# Patient Record
Sex: MALE | Race: WHITE
[De-identification: names, ages, dates, MRNs, and addresses within clinical notes are randomized per-mention and may not be internally consistent; named-entity substitution may affect disease eponyms.]

---

## 2017-03-07 ENCOUNTER — HOSPITAL ENCOUNTER (OUTPATIENT)
Dept: HOSPITAL 47 - RADXRYALE | Age: 27
Discharge: HOME | End: 2017-03-07
Payer: COMMERCIAL

## 2017-03-07 DIAGNOSIS — R07.1: Primary | ICD-10-CM

## 2017-03-07 PROCEDURE — 71020: CPT

## 2017-03-09 NOTE — XR
EXAMINATION TYPE: XR chest 2V

 

DATE OF EXAM: 3/7/2017 11:57 AM

 

COMPARISON: Thoracic spine x-ray from 19 2016

 

HISTORY: Burning right-sided chest pain.

 

TECHNIQUE:  Frontal and lateral views of the chest are obtained.

 

FINDINGS: Some elevation and eventration of the anterior aspect right hemidiaphragm is present. There
 is no focal air space opacity, pleural effusion, or pneumothorax seen.  The cardiac silhouette size 
is within normal limits. Slight underlying levoconvex scoliotic curvature in the upper thoracic spine
 is redemonstrated.

 

IMPRESSION:  No acute cardiopulmonary process.

## 2017-05-31 ENCOUNTER — HOSPITAL ENCOUNTER (OUTPATIENT)
Dept: HOSPITAL 47 - RADUSWWP | Age: 27
Discharge: HOME | End: 2017-05-31
Payer: COMMERCIAL

## 2017-05-31 DIAGNOSIS — R10.9: Primary | ICD-10-CM

## 2017-05-31 PROCEDURE — 76700 US EXAM ABDOM COMPLETE: CPT

## 2017-05-31 NOTE — US
EXAMINATION TYPE: US abdomen complete

 

DATE OF EXAM: 5/31/2017

 

COMPARISON: NONE

 

CLINICAL HISTORY: R10.9 Abdominal pain. Weight gain, Lupus, Nausea/vomiting.

 

EXAM MEASUREMENTS:

 

Liver Length:  17.0 cm   

Gallbladder Wall:  0.2 cm   

CBD:  0.3 cm

Spleen:  10.5 cm   

Right Kidney:  12.5 x 5.9 x 4.9 cm 

Left Kidney:  12.5 x 5.7 x 5.4  cm   

 

 

 

Pancreas:  wnl, partially obscured by bowel gas.

Liver:  Large, hyperechoic, with areas of focal sparing near GB  

Gallbladder:  wnl

**Evidence for sonographic Loera's sign:  no

CBD:  wnl 

Spleen:  wnl, upper limits for size 

Right Kidney:  Small cyst mid/upper pole = 0.8 x 0.8 x 0.7 cm    

Left Kidney:  wnl   

Upper IVC:  wnl  

Abd Aorta:  wnl

 

 

 

The visualized liver is heterogeneously hyperechoic. Evaluation for focal masses suboptimal due to th
e heterogeneity.  The intrahepatic portion of the IVC and visualized abdominal aorta are within sherron
l limits.  There is no evidence of cholelithiasis.  Common bile duct is unremarkable.  The visualized
 portions of the pancreas are slightly heterogeneous.  The spleen is unremarkable.  Kidneys are symme
tric and free of hydronephrosis.  No renal lesions are seen.

 

IMPRESSION: Heterogeneous hyperechoic appearance of liver favors diffuse fatty infiltration.

## 2019-07-30 ENCOUNTER — HOSPITAL ENCOUNTER (OUTPATIENT)
Dept: HOSPITAL 47 - RADXRYALE | Age: 29
Discharge: HOME | End: 2019-07-30
Attending: FAMILY MEDICINE
Payer: COMMERCIAL

## 2019-07-30 DIAGNOSIS — M65.4: ICD-10-CM

## 2019-07-30 DIAGNOSIS — M25.531: Primary | ICD-10-CM

## 2019-07-30 NOTE — XR
EXAMINATION TYPE: XR wrist complete RT

 

DATE OF EXAM: 7/30/2019

 

CLINICAL HISTORY: Scaphoid pain for one month

 

TECHNIQUE:  Frontal, lateral and oblique images of the right wrist are obtained. Scaphoid view was al
so obtained.

 

COMPARISON: None

 

FINDINGS:  There is no acute fracture/dislocation evident in the right wrist.  The joint spaces in th
e right wrist appear within normal limits.  The overlying soft tissue appears unremarkable.

 

IMPRESSION:  There is no acute fracture or dislocation in the right wrist. MRI could evaluate for oss
eous contusion, tendinous injury or ligamentous injury if there is further clinical concern and sophia
nued snuffbox pain.

## 2020-01-20 ENCOUNTER — HOSPITAL ENCOUNTER (OUTPATIENT)
Dept: HOSPITAL 47 - RADMRIMAIN | Age: 30
Discharge: HOME | End: 2020-01-20
Attending: FAMILY MEDICINE
Payer: COMMERCIAL

## 2020-01-20 DIAGNOSIS — M51.36: Primary | ICD-10-CM

## 2020-01-20 PROCEDURE — 72158 MRI LUMBAR SPINE W/O & W/DYE: CPT

## 2020-01-20 PROCEDURE — 72141 MRI NECK SPINE W/O DYE: CPT

## 2020-01-20 NOTE — MR
EXAMINATION TYPE: MR cervical spine wo con

 

DATE OF EXAM: 1/20/2020

 

COMPARISON: Prior exam cervical spine MR 10/10/2016

 

HISTORY: Cervical stenosis

 

TECHNIQUE: 

Multiplanar, multisequence images of the cervical spine were acquired.

 

C2-C3: No evidence for degenerative disc disease.  No disc bulge/herniation or protrusion.  No Canal 
stenosis. Uncovertebral joint hypertrophy encroaches somewhat on the right neural foramen similar to 
prior exam.

 

C3-C4: No evidence for degenerative disc disease.  No disc bulge/herniation or protrusion.  No Canal 
stenosis. Uncovertebral joint hypertrophy encroaches somewhat on the right neural foramen similar to 
prior exam..

 

C4-C5: Posterior extension endplate disc complex causes mild anterior mass effect on the thecal sac. 
Bilateral foraminal encroachment is present as on prior exam.

 

C5-C6: No evidence for degenerative disc disease.  No disc bulge/herniation or protrusion.  No Canal 
stenosis. Some mild foraminal encroachment due to uncovertebral joint hypertrophy noted..

 

C6-C7: No evidence for degenerative disc disease.  No disc bulge/herniation or protrusion.  No Canal 
stenosis.  Foramina are patent bilaterally.

 

C7-T1: No evidence for degenerative disc disease.  No disc bulge/herniation or protrusion.  No Canal 
stenosis.  Foramina are patent bilaterally.

 

Cervical segments are intact.  There is normal alignment.  Cervical spinal cord is of normal signal. 
 Craniovertebral junction relationships are within normal limits.  Spinal curvature is again noted. L
oss of disc height and signal is greatest at C4-5, C5-6 but also present at C3-4, C6-7. There is asso
ciated spondylosis as on prior exam.

 

IMPRESSION:

Mild degenerative disc disease is similar to prior exam. There is a spinal curvature present.

## 2020-01-20 NOTE — MR
EXAMINATION TYPE: MR lumbar spine wo/w con

 

DATE OF EXAM: 1/20/2020

 

COMPARISON: Prior lumbar MRI 10/10/2016

 

HISTORY: Low back pain

 

TECHNIQUE: 

Multiplanar, multisequence images of the lumbar spine were acquired utilizing 11.5 mL intravenous Timothy
avist gadolinium contrast.    

 

L1-L2: Normal disc appearance without desiccation.  No herniation, protrusion or disc bulging.  No ca
nal stenosis is present.  Foramina are patent bilaterally.

 

L2-L3: Normal disc appearance without desiccation.  No herniation, protrusion or disc bulging.  No ca
nal stenosis is present.  Foramina are patent bilaterally.

 

L3-L4: Posterior disc bulge causes anterior mass effect on the thecal sac. Increased signal at the po
sterior aspect of the disc consistent with annular tear. Circumferential extension endplate disc comp
levar encroaches somewhat on the neural foramina greater on the right.

 

L4-L5: Listhesis contributes to cause bilateral foraminal encroachment greater on the left. There are
 laminectomy changes present.

 

L5-S1: Normal disc appearance without desiccation.  No herniation, protrusion or disc bulging.  No ca
nal stenosis is present.  Foramina are patent bilaterally.

 

Lumbar segments are stable.  No paraspinal masses are identified.  Conus medullaris has a normal appe
arance. Posterior fusion is present at L4-5 with persistent listhesis L4-5 grade 1. Prior tract prese
nt bilaterally at transitional segment previously denoted as S1, S1 previously placed screws again no
otto and have been removed. Tarlov cysts are noted in the sacral region. Loss of disc height signal ag
ain noted L3-4, L4-5 and L5-S1, there is endplate marrow signal changes present L5-S1. T2 bright foci
 within the kidneys likely represent cysts. Dorsal enhancing soft tissue at the L4 level is again see
n and is likely postoperative granulation tissue No additional abnormal enhancement following contras
t administration.

 

IMPRESSION:

Postop changes as described are similar to prior exam. Transitional lumbosacral spine segment, correl
ate with plain film prior to any intervention.

## 2020-01-24 ENCOUNTER — HOSPITAL ENCOUNTER (OUTPATIENT)
Dept: HOSPITAL 47 - RADMRIMAIN | Age: 30
Discharge: HOME | End: 2020-01-24
Attending: FAMILY MEDICINE
Payer: COMMERCIAL

## 2020-01-24 DIAGNOSIS — M51.34: Primary | ICD-10-CM

## 2020-01-24 PROCEDURE — 72146 MRI CHEST SPINE W/O DYE: CPT

## 2020-01-24 NOTE — MR
EXAMINATION TYPE: MR thoracic spine wo con

 

DATE OF EXAM: 1/24/2020

 

COMPARISON: None

 

HISTORY: Back pain

 

CONTRAST:  Performed utilizing 0 mL intravenous Gadavist gadolinium contrast.  

 

TECHNIQUE: Multiplanar, multiecho imaging on a 3.0 Brenda magnet is performed through the thoracic spi
ne. 

 

Spinal cord maintains normal signal through its visualized course.

Vertebral body alignment is normal. Some mild scoliosis in the upper thoracic lower cervical spine ma
y be present, this could be positional.

Vertebral body heights are preserved.

Disc heights are preserved.

Disc hydration levels are preserved. No disc herniations are evident.

 

No spinal canal stenosis is evident. No syrinx is identified.

 

IMPRESSIONS:

 

1. Mild upper thoracic scoliosis which could be positional.

2. MRI Thoracic spine is otherwise unremarkable.

## 2020-02-04 ENCOUNTER — HOSPITAL ENCOUNTER (OUTPATIENT)
Dept: HOSPITAL 47 - PNWHC3 | Age: 30
Discharge: HOME | End: 2020-02-04
Attending: ANESTHESIOLOGY
Payer: COMMERCIAL

## 2020-02-04 VITALS — HEART RATE: 88 BPM | SYSTOLIC BLOOD PRESSURE: 122 MMHG | DIASTOLIC BLOOD PRESSURE: 86 MMHG | RESPIRATION RATE: 14 BRPM

## 2020-02-04 DIAGNOSIS — G89.29: Primary | ICD-10-CM

## 2020-02-04 DIAGNOSIS — Z79.891: ICD-10-CM

## 2020-02-04 DIAGNOSIS — M47.812: ICD-10-CM

## 2020-02-04 DIAGNOSIS — M50.30: ICD-10-CM

## 2020-02-04 DIAGNOSIS — M47.816: ICD-10-CM

## 2020-02-04 DIAGNOSIS — Z79.899: ICD-10-CM

## 2020-02-04 DIAGNOSIS — M96.1: ICD-10-CM

## 2020-02-04 PROCEDURE — 99211 OFF/OP EST MAY X REQ PHY/QHP: CPT

## 2020-02-04 NOTE — P.CONS
History of Present Illness





- Reason for Consult


Consult date: 02/04/20





- Chief Complaint


Neck and lower back pain





- History of Present Illness


This is a 29-year-old gentleman with chronic history of neck and lower back 

pain.  The patient had 2 surgeries on the lumbar spine with lumbar fusion one in

2005 and the second one in 2013.  He describes his pain as sharp and shooting it

is mostly in his lower back and goes to the right buttock area but does not go 

to the lower extremities.  He denies any numbness or tingling in the lower 

extremities however he does feel numbness and tingling in both arms with neck 

pain which radiates down to the shoulders only.  The patient has been taking 

Percocet and morphine for his pain but this has not been controlling his pain 

well.  He denies any bowel or bladder dysfunction or any weight loss recently.  

He also denies any fever or chills recently.  The pain does wake the patient up 

at night.  The patient is unemployed at this point and he we'll apply for 

disability because of his chronic pain problem.








Past Medical History


Past Medical History: Hypertension


Additional Past Medical History / Comment(s): chronic back and neck pain, hx of 

back fx at age 11 or 12, states being tested for lupus, hx of low testosterone,


History of Any Multi-Drug Resistant Organisms: None Reported


Past Surgical History: Back Surgery


Additional Past Surgical History / Comment(s): fusion 2005 and 2013, rods and 

screws, previous epidurals and pain clinic procedures


Past Anesthesia/Blood Transfusion Reactions: No Reported Reaction


Smoking Status: Never smoker





- Past Family History


  ** Mother


Family Medical History: No Reported History





Medications and Allergies


                                Home Medications











 Medication  Instructions  Recorded  Confirmed  Type


 


Atenolol/Chlorthalidone 1 each PO DAILY 01/30/20 01/30/20 History





[Atenolol-Chlorthalidone 100-25]    


 


Baclofen [Lioresal] 20 mg PO TID 01/30/20 01/30/20 History


 


DULoxetine HCL [Cymbalta] 60 mg PO DAILY 01/30/20 01/30/20 History


 


Ergocalciferol [Vitamin D2 50,000 unit PO Q30D 01/30/20 01/30/20 History





(DRISDOL)]    


 


Gabapentin [Neurontin] 800 mg PO QID 01/30/20 02/04/20 History


 


Morphine Sulfate [Morphine Sulfate 30 mg PO Q12H 01/30/20 02/04/20 History





ER]    


 


Testosterone    200 mg INJ FR 01/30/20  History


 


oxyCODONE-APAP 10-325MG [Percocet 1 tab PO TID 01/30/20 02/04/20 History





 mg]    








                                    Allergies











Allergy/AdvReac Type Severity Reaction Status Date / Time


 


No Known Allergies Allergy   Verified 01/30/20 15:06














Physical Exam


Vitals: 


                                   Vital Signs











  Pulse Resp BP


 


 02/04/20 11:47  88  14  122/86














- Constitutional


General appearance: average body habitus





- EENT


Eyes: PERRLA





- Respiratory


Respiratory: bilateral: CTA





- Cardiovascular


Rhythm: regular





- Neurologic


Neuro exam of the upper extremities showed normal and symmetrical deep tendon 

reflexes and normal and symmetrical muscle strength.


Neuro exam of the lower extremities showed normal and symmetrical muscle 

strength, decreased knee reflexes bilaterally, absent ankle reflexes 

bilaterally.


Straight leg raising test negative bilaterally.





Neurologic: CNII-XII intact





- Musculoskeletal


Positive tenderness in the lumbar paravertebral musculature bilaterally.  Mild 

tenderness around the sacroiliac joints bilaterally.  Decreased range of motion 

of the lumbar spine.


Normal but painful range of motion of the cervical spine.


Postoperative tenderness in the cervical paravertebral musculature and in the 

trapezius muscles bilaterally.








Results


Results: 


Lumbar spine MRI showed annular tear and disc bulging at the L3-L4 level due to 

associated with posterior fusion at the L4 5 with persistent listhesis at L4 5 

level and grade 1 and a transitional segment at L5-S1 level.


The cervical spine MRI showed mild degenerative disc disease with loss of disc 

height especially at 








Assessment and Plan


Plan: 


This is a 29-year-old gentleman with history of chronic neck and lower back pain

with the following diagnoses:


Lumbar postlaminectomy pain syndrome


Lumbar DDD


Lumbar spondylosis without myelopathy


Cervical DDD


Cervical spondylosis without myelopathy





We'll plan to do a diagnostic lumbar medial branch block above the fusion level.

 We will proceed with RFA in the future of this block helps the patient's pain.


The patient had cervical epidural steroid injection previously which gave him 3 

months of pain relief and if his neck pain continues to be a problem we can plan

on doing cervical epidural steroid injection in the future.  The patient denies 

taking any anticoagulants.  And he is not diabetic.





I thank you for the referral

## 2020-03-05 ENCOUNTER — HOSPITAL ENCOUNTER (OUTPATIENT)
Dept: HOSPITAL 47 - ORPAIN | Age: 30
Discharge: HOME | End: 2020-03-05
Attending: ANESTHESIOLOGY
Payer: COMMERCIAL

## 2020-03-05 VITALS — BODY MASS INDEX: 30.4 KG/M2

## 2020-03-05 VITALS — HEART RATE: 74 BPM | DIASTOLIC BLOOD PRESSURE: 57 MMHG | SYSTOLIC BLOOD PRESSURE: 111 MMHG

## 2020-03-05 VITALS — TEMPERATURE: 98.8 F | RESPIRATION RATE: 16 BRPM

## 2020-03-05 DIAGNOSIS — M47.816: ICD-10-CM

## 2020-03-05 DIAGNOSIS — G89.29: Primary | ICD-10-CM

## 2020-03-05 PROCEDURE — 99152 MOD SED SAME PHYS/QHP 5/>YRS: CPT

## 2020-03-05 PROCEDURE — 64493 INJ PARAVERT F JNT L/S 1 LEV: CPT

## 2020-03-05 PROCEDURE — 64494 INJ PARAVERT F JNT L/S 2 LEV: CPT

## 2020-03-05 NOTE — FL
EXAMINATION TYPE: FL guided pain mgmt statistic

 

DATE OF EXAM: 3/5/2020

 

CLINICAL HISTORY: Low back pain.

 

TECHNIQUE: Fluoroscopy.

 

COMPARISON:  None.

 

FINDINGS:  Fluoroscopic guidance was provided during pain relief procedure performed by Dr. Jensen 
.  A total of 10 seconds of fluoroscopic time was utilized during the procedure and 6 spot images are
 acquired. Images acquired shows needle localization at multiple levels of the lumbar spine.

 

IMPRESSION:  As Above.

## 2020-03-05 NOTE — P.PCN
Date of Procedure: 03/05/20


Procedure(s) Performed: 














PREOPERATIVE DIAGNOSIS   :   1-  Lumbar spondylosis with  Facet Arthropathy 

without myelopathy . 





POSTOPERATIVE DIAGNOSIS:   1-  Lumbar spondylosis with  Facet Arthropathy 

without myelopathy .  


 


PROCEDURE: Diagnostic  bilateral  L1 , L2  ,L3  , medial branch block under  

fluoroscopy guidance(fluoroscopy images available in the radiology Department )


                                ( To target the facet joint between L2-3  , and 

L3-4  )





ANESTHESIA:, moderate sedation with intravenous Versed  2  mg and Fentanyl 50   

mcg.


EBL: Minimal





COMPLICATION: None.





IV FLUIDS: 100 mL of normal saline.


 


PROCEDURE INDICATION: Chronic low back pain secondary to Facet arthropathy 

unresponsive to conservative treatment.  





PROCEDURE DESCRIPTION:  the patient was seen and identified in the preop holding

area , risks and benefits and possible complications of the procedure and 

alternative                                                                     

                                                                                

                                                                                

                                            were discussed with the patient, and

the patient agreed  to proceed with the procedure and signed the consent        

                                                                                

                                                 IV was started and vital signs 

monitored during the  procedure and fluoroscopy was used to maximize the benefit

and accuracy of the needle placement, and sedation was given to decrease patient

 anxiety, patient was taken to the procedure room and placed in prone position 

vital signs monitored in the back prepped with chlorhexidine X3 then under 

strict sterile technique using a right oblique fluoroscopy ,the  junction of the

transverse process and the superior articulating process of the right L1 , L2 , 

L3 ,  vertebra which corresponding to the fluoroscopy image of the eye of the 

Sam dog on the block side for the medial branches and subsequently , after 

local infiltration of skin and subcu tissuies with Ropivacaine 0.5 %  , one mL  

at  each level ,then  22-gauge Quincke-type needles , 3 needle was used  , each 

one of them placed at the junction of the base of the transverse process and the

superior articular process at the appropriate level, and the needle was advanced

until the periosteum contacted, needle placement confirmed with AP oblique and 

lateral view and after appropriate needle placement confirmed, and after 

negative aspiration for heme and CSF and there was no paresthesia 1-1/2 mL of 

Ropivacaine 0.5% mixed with 20 mg Depo-Medrol , then  half mL injected at each 

level after negative aspiration the needle subsequently removed and the same 

procedure repeated for the left side at left side at  L1 ,L2 ,L3 , levels.


At the end of the procedure and the needles removed and a bandage applied after 

the skin was cleaned the cleaning solution patient taken to recovery room in 

stable condition and monitors in the recovery room for 20-30 minutes and 

discharged home in stable condition after discharge criteria met and patient 

will follow up with the pain clinic in 2-4 weeks

## 2023-08-24 ENCOUNTER — HOSPITAL ENCOUNTER (OUTPATIENT)
Dept: HOSPITAL 47 - RADXRYALE | Age: 33
Discharge: HOME | End: 2023-08-24
Attending: PHYSICIAN ASSISTANT
Payer: COMMERCIAL

## 2023-08-24 DIAGNOSIS — M54.6: Primary | ICD-10-CM

## 2023-08-24 PROCEDURE — 72072 X-RAY EXAM THORAC SPINE 3VWS: CPT

## 2023-08-24 NOTE — XR
EXAMINATION TYPE: XR thoracic spine complete

 

DATE OF EXAM: 8/24/2023 3:38 PM

 

INDICATION: 

Patient age:Male;  33 years old; 

Reason for study: M546 THOR PAIN; [facility]. 

 

COMPARISON: Thoracic spine radiograph 4/19/2016, MRI thoracic spine 1/24/2020

 

TECHNIQUE: 3 views of the thoracic spine in frontal, lateral, Schwimmer's projections. 

 

FINDINGS: 

Mild central compression deformity involving the superior endplate of the likely T8 vertebral body. T
here is approximately 5% height loss. No obvious retropulsion. There is no evidence of disk space ashish
rowing. There is normal alignment of the thoracic vertebral bodies. 

 

IMPRESSION: 

Age-indeterminate mild central compression deformity of the superior endplate of likely T8 vertebral 
body. Correlate with point tenderness.

## 2023-10-30 ENCOUNTER — HOSPITAL ENCOUNTER (OUTPATIENT)
Dept: HOSPITAL 47 - RADMRIMAIN | Age: 33
Discharge: HOME | End: 2023-10-30
Attending: NEUROLOGICAL SURGERY
Payer: COMMERCIAL

## 2023-10-30 DIAGNOSIS — G95.89: ICD-10-CM

## 2023-10-30 DIAGNOSIS — M47.14: Primary | ICD-10-CM

## 2023-10-30 DIAGNOSIS — M50.021: ICD-10-CM

## 2023-10-30 PROCEDURE — 72146 MRI CHEST SPINE W/O DYE: CPT

## 2023-10-30 PROCEDURE — 72141 MRI NECK SPINE W/O DYE: CPT

## 2023-10-30 NOTE — MR
EXAMINATION TYPE: MR deleon wo con

 

DATE OF EXAM: 10/30/2023

 

COMPARISON: 1/20/2020

 

HISTORY: Neck/Mid back pain, headaches, numbness in padmini arms/hands

 

CONTRAST:

 

TECHNIQUE: Multiplanar multiecho imaging on a 3.0 Brenda magnet is performed through the cervical spin
e.

 

FINDINGS: The craniovertebral junction is normal.  Vertebral body alignment is normal.

 

C7-T1:  No focal disc herniation or significant disc bulge is evident.  No spinal canal stenosis or n
eural foraminal stenosis is present.

 

C6-7: No focal disc herniation or significant disc bulge is evident.  No spinal canal stenosis or flash
ral foraminal stenosis is present.

 

C5-6: Mild subligamentous disc extension beyond the endplate of C5 may be present previously is minim
al anterior thecal sac compression. No AP spinal canal stenosis present. No cord contact is evident..


 

C4-5: There is mild disc bulging which appears to have some subligamentous disc extension is in the s
agittal plane. This is mild anterior thecal sac compression. No cord contact or spinal canal stenosis
 present. Neural foramen are patent..

 

C3-4: No focal disc herniation or significant disc bulge is evident.  No spinal canal stenosis or flash
ral foraminal stenosis is present.

 

C2-3: No focal disc herniation or significant disc bulge is evident.  No spinal canal stenosis or flash
ral foraminal stenosis is present.

 

 

IMPRESSION:

 

1. Minimal subligamentous disc herniation C4-5 C5-6 without spinal canal stenosis or cord contact. Fi
ndings appear minimally progressive from the comparison study.

 

 

EXAMINATION TYPE: MR deleon wo con

 

DATE OF EXAM: 10/30/2023

 

COMPARISON: 1/24/2020

 

HISTORY: Neck/Mid back pain, headaches, numbness in padmini arms/hands

 

CONTRAST:  Performed utilizing 0 mL intravenous Gadavist gadolinium contrast.  

 

TECHNIQUE: Multiplanar, multiecho imaging on a 3.0 Brenda magnet is performed through the thoracic spi
ne. 

 

Spinal cord maintains normal signal through its visualized course.

Vertebral body alignment is normal. 

Vertebral body heights are preserved.

Disc heights are preserved.

Disc hydration levels are preserved.

 

No spinal canal stenosis is evident.

 

IMPRESSION:

 

1. No significant acute changes MRI thoracic spine. Appearance is unchanged from comparison.